# Patient Record
Sex: MALE | Employment: OTHER | URBAN - METROPOLITAN AREA
[De-identification: names, ages, dates, MRNs, and addresses within clinical notes are randomized per-mention and may not be internally consistent; named-entity substitution may affect disease eponyms.]

---

## 2019-09-24 ENCOUNTER — TELEPHONE (OUTPATIENT)
Dept: DERMATOLOGY | Facility: CLINIC | Age: 84
End: 2019-09-24

## 2019-09-24 NOTE — TELEPHONE ENCOUNTER
----- Message from Brittany Nicole sent at 9/24/2019 12:46 PM CDT -----  Contact: Pt  Name of Who is Calling:    What is the request in detail: Patient wife would like a call back regarding changing appointment time Please contact to further discuss and advise     Can the clinic reply by [facility]: no    What Number to Call Back if not in [facility]: [phone] or [phone]

## 2019-10-03 ENCOUNTER — INITIAL CONSULT (OUTPATIENT)
Dept: DERMATOLOGY | Facility: CLINIC | Age: 84
End: 2019-10-03
Payer: MEDICARE

## 2019-10-03 VITALS
HEART RATE: 66 BPM | WEIGHT: 129 LBS | HEIGHT: 66 IN | BODY MASS INDEX: 20.73 KG/M2 | DIASTOLIC BLOOD PRESSURE: 47 MMHG | SYSTOLIC BLOOD PRESSURE: 72 MMHG

## 2019-10-03 DIAGNOSIS — C44.42 SQUAMOUS CELL CARCINOMA OF SCALP: Primary | ICD-10-CM

## 2019-10-03 PROCEDURE — 99999 PR PBB SHADOW E&M-EST. PATIENT-LVL III: ICD-10-PCS | Mod: PBBFAC,,, | Performed by: DERMATOLOGY

## 2019-10-03 PROCEDURE — 99213 OFFICE O/P EST LOW 20 MIN: CPT | Mod: PBBFAC,PO | Performed by: DERMATOLOGY

## 2019-10-03 PROCEDURE — 99202 PR OFFICE/OUTPT VISIT, NEW, LEVL II, 15-29 MIN: ICD-10-PCS | Mod: S$PBB,,, | Performed by: DERMATOLOGY

## 2019-10-03 PROCEDURE — 99999 PR PBB SHADOW E&M-EST. PATIENT-LVL III: CPT | Mod: PBBFAC,,, | Performed by: DERMATOLOGY

## 2019-10-03 PROCEDURE — 99202 OFFICE O/P NEW SF 15 MIN: CPT | Mod: S$PBB,,, | Performed by: DERMATOLOGY

## 2019-10-03 RX ORDER — FUROSEMIDE 20 MG/1
20 TABLET ORAL 2 TIMES DAILY
COMMUNITY

## 2019-10-03 RX ORDER — PRAVASTATIN SODIUM 20 MG/1
20 TABLET ORAL DAILY
COMMUNITY

## 2019-10-03 RX ORDER — PIRFENIDONE 267 MG/1
CAPSULE ORAL
COMMUNITY

## 2019-10-03 RX ORDER — CHOLECALCIFEROL (VITAMIN D3) 25 MCG
1000 TABLET ORAL DAILY
COMMUNITY

## 2019-10-03 RX ORDER — WARFARIN SODIUM 5 MG/1
5 TABLET ORAL DAILY
COMMUNITY

## 2019-10-03 NOTE — PROGRESS NOTES
+++ HAS A DEFIBRILLATOR +++   COUMADIN  ALLERGIES:  Patient has no known allergies.    The patient is accompanied to this visit by his wife.    CHIEF COMPLAINT:  This 86 y.o. 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 Consultation requested by Don Arzate M.D..    HISTORY OF PRESENT ILLNESS:   Location: right scalp  Duration: 6 months or more  Quality: persistent  Context: status post biopsy by Don Arzate M.D. ; path = squamous cell carcinoma; pathology accession # [id number], Skin Pathology   Prior Treatment: none    See also the handwritten notes/diagrams scanned to chart for additional details.    Defibrillator: Yes  Pacemaker: No  Artificial heart valves: No  Artificial joints: No    REVIEW OF SYSTEMS:   General: general health fair  Skin: previous skin cancer(s) Yes     Relevant other:  No   Cardiovascular:   High Blood Pressure: Yes   Chest Pain:  No   Defibrillator: +++ HAS A DEFIBRILLATOR +++ it has never fired  Pacemaker: as above  Artificial heart valves: as above  Prior Endocarditis: No   Prior Heart Attack/MI: [id number]    If yes, when: 1989  Prior Cardiac Bypass or Stents:  No   If yes, when:   Mitral Valve Prolapse: No   Relevant other:  No   Respiratory:   Shortness of breath:  Yes  Relevant other:  Yes. Pulmonary fibrosis; on O2  Endocrine:   Diabetes:  No   Relevant other:  No   Hem/Lymph:   Taking Prescribed Blood Thinners:  COUMADIN  Easy Bleeding:  Yes  Relevant other:  No   Allergy/Immuno: as noted above  Relevant other:  No   GI:   Prior Hepatitis:  No     If yes, details:   Relevant other:  No   Musculoskeletal:   Artificial joints: as above  Relevant other:  No   Neurologic:   Prior Stroke:  No     If yes, details:   Relevant other:  No   Relevant other info:  No      PAST MEDICAL HISTORY:  Past Medical History:   Diagnosis Date    Arthritis     Defibrillator discharge      Hypertension     Pulmonary fibrosis        PAST SURGICAL HISTORY:  Past Surgical History:   Procedure Laterality Date    HERNIA REPAIR          SOCIAL HISTORY:  Dependencies: smoking status as noted below  Social History     Tobacco Use    Smoking status: Not on file   Substance Use Topics    Alcohol use: Not on file    Drug use: Not on file       PERTINENT MEDICATIONS:  See medications list.    Current Outpatient Medications:     furosemide (LASIX) 20 MG tablet, Take 20 mg by mouth 2 (two) times daily., Disp: , Rfl:     pirfenidone (ESBRIET) 267 mg Cap, Take by mouth., Disp: , Rfl:     pravastatin (PRAVACHOL) 20 MG tablet, Take 20 mg by mouth once daily., Disp: , Rfl:     sacubitril-valsartan (ENTRESTO) 24-26 mg per tablet, Take 1 tablet by mouth 2 (two) times daily., Disp: , Rfl:     vitamin D (VITAMIN D3) 1000 units Tab, Take 1,000 Units by mouth once daily., Disp: , Rfl:     warfarin (COUMADIN) 5 MG tablet, Take 5 mg by mouth once daily., Disp: , Rfl:     ALLERGIES:  Patient has no known allergies.    EXAM:  See also the handwritten notes/diagrams scanned to chart for additional details.  Constitutional  General appearance: well-developed, well-nourished, well-kempt older white male; in a wheelchair, on O2    Eyes  Inspection of conjunctivae and lids reveals no abnormalities; sclerae anicteric  Neurologic/Psychiatric  Alert,  normal orientation to time, place, person  Normal mood and affect with no evidence of depression, anxiety, agitation  Skin: see photo(s)  Head: background marked solar damage to exposed areas of skin; on his right scalp there is an approximately 1.5 cm pink, scaly plaque which feels freely movable over the underlying tissues on palpation;  he confirmed this as the site of the prior biopsy  Neck: examination reveals marked chronic solar damage  Right upper extremity: examination reveals marked chronic solar damage; some ecchymosis/ecchymoses   Left upper extremity: examination  reveals marked chronic solar damage; some ecchymosis/ecchymoses   Photo(s) this visit:        ASSESSMENT: 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 In particular, the discussion included, but was not limited to, the following:    One alternative at this point would be to defer further treatment and observe the lesion. With small skin cancers of this kind, it is possible that a biopsy can be sufficient to definitively treat a small skin cancer of this kind. Alternatively, some skin cancers are slow growing and do not require immediate treatment. The potential advantage of this choice would be to avoid the need for possibly unnecessary additional surgery. Among the potential disadvantages of this would be the possibility of enlargement of the lesion, more extensive spread of the lesion or recurrence at a later date, which might necessitate a larger and more complex surgery.    Radiation treatment can be an effective treatment for this type of skin cancer. The usual course of treatment is every weekday for several weeks. Local irritation will result from treatment, although no systemic side effects are expected. The potential advantage of radiation treatment is that it avoids the need for surgery. Among the disadvantages of radiation treatment are the length of treatment, the local inflammatory response, the absence of pathologic confirmation of the removal of the skin cancer, a possible increased risk of additional skin cancer in the treated area in later  years, and a somewhat increased risk of recurrence at a later date.     Excisional surgery can be an effective treatment for this type of skin cancer. This would involve excision of the lesion with margin evaluation by submitting the specimen to a pathologist for either immediate marginal assessment via frozen section processing, or delayed marginal assessment by fixed-tissue processing. The potential advantage of this technique is that it offers a way of treating the lesion with some degree of histologic confirmation of tumor removal. Among the disadvantages of this treatment are the possible need for re-excision if marginal involvement is identified, a somewhat greater likelihood of recurrence as compared to Mohs' surgery because of the less comprehensive margin evaluation inherent in the technique, and the general potential risks of surgery, including allergic reactions to the anesthetic and other materials used, infection, injury to nerves in the area with consequent loss of sensation or muscle function, and scarring or distortion of surrounding structures.    Mohs' surgery is a very effective treatment for this type of skin cancer. The potential advantage of Mohs' surgery is that this technique offers the greatest possible certainty of knowing that the skin cancer has been completely removed, with the removal of the least amount of normal tissue. The potential disadvantages of Mohs' surgery include the duration of the surgery, the possible need for a separate surgery for reconstruction following tumor removal, and scarring as a result. In addition, general potential risks of surgery as noted above also apply to treatment via Mohs' surgery.    In light of the nature of this tumor and the location on the scalp in an area of increased risk of recurrence,  Mohs' micrographic surgery was thought to be the most appropriate management choice, and this diagnosis is appropriate for treatment by Mohs' micrographic  surgery.     Sufficient time was available for questions, and all questions were answered to his satisfaction. He fully understands the aims, risks, alternatives, and possible complications, and has elected to proceed with the surgery, and verbally consented to do so. The procedure will be scheduled in the near future.    Routine pre-op instructions were given to him.    The patient was instructed to continue warfarin prior to surgery.    A consultation report will be sent to Dr. Arzate.  --------------------------------------  Note: Some or all of this note may have been generated using voice recognition software. There may be voice recognition errors including grammatical and/or spelling errors found in the text. Attempts were made to correct these errors prior to signature.

## 2019-10-03 NOTE — LETTER
October 5, 2019      [place]  [address] [place] [address]           [facility] [address] [place] [address]  Phone: [phone]          Patient: Owen White   MR Number: [id number]   YOB: 1932   Date of Visit: 10/3/2019       Dear Dr. Don Arzate III:    Thank you for referring Owen White to me for evaluation. Attached you will find relevant portions of my assessment and plan of care.    If you have questions, please do not hesitate to call me. I look forward to following Owen White along with you.    Sincerely,    Jesus Gómez MD    Enclosure  CC:  No Recipients    If you would like to receive this communication electronically, please contact [email or web address] or [phone] to request more information on [facility] access.    For providers and/or their staff who would like to refer a patient to [facility], please contact us through our one-stop-shop provider referral line, [facility], at [phone].    If you feel you have received this communication in error or would no longer like to receive these types of communications, please e-mail [email or web address]

## 2019-10-19 NOTE — PROGRESS NOTES
+++ HAS A DEFIBRILLATOR +++  Prior photo(s) of site(s) to confirm location(s):    ALLERGIES:   Patient has no known allergies.  +++ HAS A DEFIBRILLATOR +++     Current Outpatient Medications:     furosemide (LASIX) 20 MG tablet, Take 20 mg by mouth 2 (two) times daily., Disp: , Rfl:     pirfenidone (ESBRIET) 267 mg Cap, Take by mouth., Disp: , Rfl:     pravastatin (PRAVACHOL) 20 MG tablet, Take 20 mg by mouth once daily., Disp: , Rfl:     sacubitril-valsartan (ENTRESTO) 24-26 mg per tablet, Take 1 tablet by mouth 2 (two) times daily., Disp: , Rfl:     vitamin D (VITAMIN D3) 1000 units Tab, Take 1,000 Units by mouth once daily., Disp: , Rfl:     warfarin (COUMADIN) 5 MG tablet, Take 5 mg by mouth once daily., Disp: , Rfl:   -------------------------------------------------------------  Allergies: Review of patient's allergies indicates:  No Known Allergies    Chief Complaint: here for Mohs' surgery to superficially invasive squamous cell carcinoma on the right scalp    The patient is accompanied to this visit by his wife.    HPI:   Location: right scalp  Quality: persistent  Timing: I last saw him two and a half weeks ago  Context: prior biopsy showed superficially invasive squamous cell carcinoma     Review of Systems:  CV: has a defibrillator  Resp: on O2 via nasal cannula 24/7 for pulmonary fibrosis  Skin: has some other rough spots on his scalp    Medications: see list    Current Outpatient Medications:     furosemide (LASIX) 20 MG tablet, Take 20 mg by mouth 2 (two) times daily., Disp: , Rfl:     pirfenidone (ESBRIET) 267 mg Cap, Take by mouth., Disp: , Rfl:     pravastatin (PRAVACHOL) 20 MG tablet, Take 20 mg by mouth once daily., Disp: , Rfl:     sacubitril-valsartan (ENTRESTO) 24-26 mg per tablet, Take 1 tablet by mouth 2 (two) times daily., Disp: , Rfl:     vitamin D (VITAMIN D3) 1000 units Tab, Take 1,000 Units by mouth once daily., Disp: , Rfl:     warfarin (COUMADIN) 5 MG tablet, Take 5 mg by  "mouth once daily., Disp: , Rfl:     Review of patient's allergies indicates:  No Known Allergies    Past Medical History:   Diagnosis Date    Arthritis     Defibrillator discharge     Hypertension     Pulmonary fibrosis        Past Surgical History:   Procedure Laterality Date    HERNIA REPAIR         Social History     Socioeconomic History    Marital status:      Spouse name: Not on file    Number of children: Not on file    Years of education: Not on file    Highest education level: Not on file   Occupational History    Not on file   Social Needs    Financial resource strain: Not on file    Food insecurity:     Worry: Not on file     Inability: Not on file    Transportation needs:     Medical: Not on file     Non-medical: Not on file   Tobacco Use    Smoking status: Never Smoker   Substance and Sexual Activity    Alcohol use: Not on file    Drug use: Not on file    Sexual activity: Not on file   Lifestyle    Physical activity:     Days per week: Not on file     Minutes per session: Not on file    Stress: Not on file   Relationships    Social connections:     Talks on phone: Not on file     Gets together: Not on file     Attends Shinto service: Not on file     Active member of club or organization: Not on file     Attends meetings of clubs or organizations: Not on file     Relationship status: Not on file   Other Topics Concern    Not on file   Social History Narrative    Not on file       Vitals:    10/21/19 0736   BP: (!) 76/42   Pulse: 68   Weight: 59 kg (130 lb)   Height: 5' 6" (1.676 m)        EXAM:  Constitutional  - General appearance: well-developed, well-nourished, well-kempt, somewhat frail-appearing white male; on O2 via nasal cannula    Eyes  - Conjunctivae and lids - no abnormalities; sclerae anicteric  Neurologic/Psychiatric  - Orientation - Alert,  normal orientation to time, place, person  - Mood and affect - Normal mood and affect with no evidence of depression, " anxiety, agitation; speech is normal  Skin: see photo below  - Scalp: on his right temporal scalp there is an approximately 1.5 x 2.5 cm pink, centrally crusted plaque at the site of the prior biopsy  Superior/medial to this there is an approx 1 cm pink plaque with some overlying irregular hyperkeratosis which is clinically suggestive of another area of superficially invasive squamous cell carcinoma    Photo today:       Assessment:  Biopsy proven squamous cell carcinoma of the right temporal scalp, pending treatment  Possible second squamous cell carcinoma vs actinic keratosis vs other, superior/medial to the lesion above    Plan:  I discussed with the patient the diagnoses and management options, and risks, benefits and alternatives. This discussion included but was not limited to the following:    I reviewed with him the nature of the previously biopsied site on the scalp. I explained that this is not an issue which requires immediate/urgent treatment.     We discussed my clinical impression regarding the site superior/medial to the previously biopsied site, and options at this point, and the risks and benefits of the alternatives.  In particular, we discussed proceeding with Mohs' surgery to the previously-biopsied site and biopsy of the other lesion at this visit, versus deferring treatment of the previously-biopsied site and biopsy of the other lesion today, to establish a definitive diagnosis and guide further treatment, with the anticipation that if the 2nd lesion is also a skin cancer, both could be treated via Mohs' surgery at the same time.    All questions were answered to his satisfaction.    After discussing the alternatives, he and I have decided to defer surgery to the previously-biopsied site today, and biopsy the other site.    See the procedure note below.    PROCEDURE NOTE: SHAVE BIOPSY    The diagnosis and management options and risk, benefits and alternatives were discussed with the patient.  All questions were answered. Verbal consent was obtained.    Site: right medial scalp  Indication: clinically suspicious lesion; rule out malignancy  Prep: Alcohol  Anesthesia: 1% lidocaine plain with epi 1:100K, less than 2 mL  Shave biopsy performed  Hemostasis with hot-tipped cautery pen  Dressed with petrolatum and bandaid  Specimen placed in formalin to be submitted to pathology    Routine care instructions given  Followup: per path  -----------------------------------------------------------  Note: Some or all of this note may have been generated using voice recognition software. There may be voice recognition errors including grammatical and/or spelling errors found in the text. Attempts were made to correct these errors prior to signature.

## 2019-10-21 ENCOUNTER — PROCEDURE VISIT (OUTPATIENT)
Dept: DERMATOLOGY | Facility: CLINIC | Age: 84
End: 2019-10-21
Payer: MEDICARE

## 2019-10-21 VITALS
HEART RATE: 68 BPM | HEIGHT: 66 IN | BODY MASS INDEX: 20.89 KG/M2 | WEIGHT: 130 LBS | SYSTOLIC BLOOD PRESSURE: 76 MMHG | DIASTOLIC BLOOD PRESSURE: 42 MMHG

## 2019-10-21 DIAGNOSIS — D48.5 NEOPLASM OF UNCERTAIN BEHAVIOR OF SKIN: Primary | ICD-10-CM

## 2019-10-21 PROCEDURE — 11102 TANGNTL BX SKIN SINGLE LES: CPT | Mod: S$PBB,,, | Performed by: DERMATOLOGY

## 2019-10-21 PROCEDURE — 99499 UNLISTED E&M SERVICE: CPT | Mod: S$PBB,,, | Performed by: DERMATOLOGY

## 2019-10-21 PROCEDURE — 11102 TANGNTL BX SKIN SINGLE LES: CPT | Mod: PBBFAC,PO | Performed by: DERMATOLOGY

## 2019-10-21 PROCEDURE — 88305 TISSUE EXAM BY PATHOLOGIST: CPT | Performed by: PATHOLOGY

## 2019-10-21 PROCEDURE — 88305 TISSUE EXAM BY PATHOLOGIST: CPT | Mod: 26,,, | Performed by: PATHOLOGY

## 2019-10-21 PROCEDURE — 99499 NO LOS: ICD-10-PCS | Mod: S$PBB,,, | Performed by: DERMATOLOGY

## 2019-10-21 PROCEDURE — 11102 PR TANGENTIAL BIOPSY, SKIN, SINGLE LESION: ICD-10-PCS | Mod: S$PBB,,, | Performed by: DERMATOLOGY

## 2019-10-21 PROCEDURE — 88305 TISSUE SPECIMEN TO PATHOLOGY, DERMATOLOGY: ICD-10-PCS | Mod: 26,,, | Performed by: PATHOLOGY

## 2019-10-30 ENCOUNTER — TELEPHONE (OUTPATIENT)
Dept: DERMATOLOGY | Facility: CLINIC | Age: 84
End: 2019-10-30

## 2019-10-30 NOTE — TELEPHONE ENCOUNTER
----- Message from Jesus Gómez MD sent at 10/27/2019 10:15 AM CDT -----  Margot,   This was another skin cancer as suspected.   Ask me please; we will need to get him in to see Dr. Gates.  FINAL PATHOLOGIC DIAGNOSIS  Skin, right medial scalp, shave biopsy:  - BASAL CELL CARCINOMA, NODULAR GROWTH PATTERN WITH FOCAL SQUAMOUS DIFFERENTIATION.  - THE DEEP AND LATERAL BIOPSY EDGES ARE INVOVLED.  [facility]  Diagnosed by: Jessica Bell  (Electronically Signed: 2019-10-26 18:29:32)

## 2019-11-20 ENCOUNTER — OFFICE VISIT (OUTPATIENT)
Dept: OTOLARYNGOLOGY | Facility: CLINIC | Age: 84
End: 2019-11-20
Payer: MEDICARE

## 2019-11-20 ENCOUNTER — OFFICE VISIT (OUTPATIENT)
Dept: DERMATOLOGY | Facility: CLINIC | Age: 84
End: 2019-11-20
Payer: MEDICARE

## 2019-11-20 VITALS — HEIGHT: 66 IN | WEIGHT: 130.06 LBS | BODY MASS INDEX: 20.9 KG/M2

## 2019-11-20 DIAGNOSIS — C44.41 BASAL CELL CARCINOMA (BCC) OF SCALP: ICD-10-CM

## 2019-11-20 DIAGNOSIS — C44.42 SQUAMOUS CELL CARCINOMA OF SCALP: Primary | ICD-10-CM

## 2019-11-20 DIAGNOSIS — Z79.01 LONG TERM (CURRENT) USE OF ANTICOAGULANTS: ICD-10-CM

## 2019-11-20 DIAGNOSIS — C44.41 BASAL CELL CARCINOMA OF SCALP: ICD-10-CM

## 2019-11-20 DIAGNOSIS — J84.10 PULMONARY FIBROSIS: ICD-10-CM

## 2019-11-20 PROCEDURE — 99999 PR PBB SHADOW E&M-EST. PATIENT-LVL III: CPT | Mod: PBBFAC,,, | Performed by: OTOLARYNGOLOGY

## 2019-11-20 PROCEDURE — 99212 PR OFFICE/OUTPT VISIT, EST, LEVL II, 10-19 MIN: ICD-10-PCS | Mod: S$PBB,,, | Performed by: DERMATOLOGY

## 2019-11-20 PROCEDURE — 99204 PR OFFICE/OUTPT VISIT, NEW, LEVL IV, 45-59 MIN: ICD-10-PCS | Mod: S$PBB,,, | Performed by: OTOLARYNGOLOGY

## 2019-11-20 PROCEDURE — 99212 OFFICE O/P EST SF 10 MIN: CPT | Mod: PBBFAC,PO | Performed by: DERMATOLOGY

## 2019-11-20 PROCEDURE — 99999 PR PBB SHADOW E&M-EST. PATIENT-LVL II: CPT | Mod: PBBFAC,,, | Performed by: DERMATOLOGY

## 2019-11-20 PROCEDURE — 99212 OFFICE O/P EST SF 10 MIN: CPT | Mod: S$PBB,,, | Performed by: DERMATOLOGY

## 2019-11-20 PROCEDURE — 99204 OFFICE O/P NEW MOD 45 MIN: CPT | Mod: S$PBB,,, | Performed by: OTOLARYNGOLOGY

## 2019-11-20 PROCEDURE — 99999 PR PBB SHADOW E&M-EST. PATIENT-LVL III: ICD-10-PCS | Mod: PBBFAC,,, | Performed by: OTOLARYNGOLOGY

## 2019-11-20 PROCEDURE — 99999 PR PBB SHADOW E&M-EST. PATIENT-LVL II: ICD-10-PCS | Mod: PBBFAC,,, | Performed by: DERMATOLOGY

## 2019-11-20 PROCEDURE — 99213 OFFICE O/P EST LOW 20 MIN: CPT | Mod: PBBFAC,27,PO | Performed by: OTOLARYNGOLOGY

## 2019-11-20 RX ORDER — WARFARIN SODIUM 5 MG/1
5 TABLET ORAL
COMMUNITY
Start: 2018-08-10

## 2019-11-20 NOTE — Clinical Note
Please fax this note with a request to be off Coumadin for 5 days:Cardiologist number is [phone].Thanks

## 2019-11-20 NOTE — PROGRESS NOTES
Subjective:       Patient ID: Owen White is a 87 y.o. male.    Chief Complaint: SCC & BCC    Owen is here for evaluation of SCC and BCC of his scalp. Both lesions are near each other.The lesion has been present for months. no pain, no bleeding, norapid growth in a short time. There is no history of previous skin cancer in the scalp. yes cardiac issues - CHF and defibrillator, yes anti-coagulants - Coumadin, no anesthesia issues. He does have pulmonary fibrosis and is oxygen dependent.     Social History     Tobacco Use   Smoking Status Never Smoker     Social History     Substance and Sexual Activity   Alcohol Use Not on file          Review of Systems   Constitutional: Negative for activity change and appetite change.   Eyes: Negative for discharge.   Respiratory: Negative for difficulty breathing and wheezing   Cardiovascular: Negative for chest pain.   Gastrointestinal: Negative for abdominal distention and abdominal pain.   Endocrine: Negative for cold intolerance and heat intolerance.   Genitourinary: Negative for dysuria.   Musculoskeletal: Negative for gait problem and joint swelling.   Skin: Negative for color change and pallor.   Neurological: Negative for syncope and weakness.   Psychiatric/Behavioral: Negative for agitation and confusion.     Objective:        Constitutional:   He is oriented to person, place, and time. He appears well-developed and well-nourished. He appears alert. He is active.   Nasal cannula Normal speech.      Head:  Normocephalic and atraumatic. Head is without TMJ tenderness. No scars. Salivary glands normal.  Facial strength is normal.        2 lesions near 1 cm, about 3 cm from each other, consistent with skin cancers previously biopsied     Ears:    Right Ear: No drainage or swelling. No middle ear effusion.   Left Ear: No drainage or swelling.  No middle ear effusion.     Nose:  No mucosal edema, rhinorrhea or sinus tenderness. No turbinate hypertrophy.       Mouth/Throat  Oropharynx clear and moist without lesions or asymmetry, normal uvula midline and mirror exam normal. Normal dentition. No uvula swelling, lacerations or trismus. No oropharyngeal exudate. Tonsillar erythema, tonsillar exudate.      Neck:  Full range of motion with neck supple and no adenopathy. Thyroid tenderness is present. No tracheal deviation, no edema, no erythema, normal range of motion, no stridor, no crepitus and no neck rigidity present. No thyroid mass present.     Cardiovascular:   Intact distal pulses and normal pulses.      Pulmonary/Chest:   Effort normal and breath sounds normal. No stridor.     Psychiatric:   His speech is normal and behavior is normal. His mood appears not anxious. His affect is not labile.     Neurological:   He is alert and oriented to person, place, and time. No sensory deficit.     Skin:   No abrasions, lacerations, lesions, or rashes. No abrasion and no bruising noted.         Tests / Results:  none    Assessment:       1. Squamous cell carcinoma of scalp    2. Basal cell carcinoma (BCC) of scalp    3. Pulmonary fibrosis    4. Long term (current) use of anticoagulants          Plan:         Owen White is scheduled to undergo Moh's surgery for excision of the lesion. Dr. Gómez will perform the resection, and I will be available for reconstruction of the defect.  He is at high anesthesia risk with his pulmonary fibrosis and anticoagulated status.  We did discuss closure which would involve likely to small rotational flaps, 1 larger flap if both of them needed to be connected.  I also discussed healing by secondary intention which would result in a cosmetic change and alopecia over the scarred area with a depression.  However, given his anesthesia ris and possible healing issues, this would be a reasonable option as well. They would like to avoid an open wound and would like to proceed with closure.  I will request if he can be off his Coumadin 3-4 days  prior and can resume 24 hr after.  We will fax a note to his cardiologist for this.  We will plan to reconstruct under local anesthesia at [facility] with possibly a little sedation.

## 2019-11-20 NOTE — LETTER
November 21, 2019      Jesus Gómez MD  [address]  [facility] [address]           [facility]  [address] [place] [address]  Phone: [phone]  Fax: [phone]          Patient: Owen White   MR Number: [id number]   YOB: 1932   Date of Visit: 11/20/2019       Dear Dr. Jesus Gómez:    Thank you for referring Owen White to me for evaluation. Attached you will find relevant portions of my assessment and plan of care.    If you have questions, please do not hesitate to call me. I look forward to following Owen White along with you.    Sincerely,    Gonzales Gates MD    Enclosure  CC:  No Recipients    If you would like to receive this communication electronically, please contact [email or web address] or [phone] to request more information on [facility] Link access.    For providers and/or their staff who would like to refer a patient to [facility], please contact us through our one-stop-shop provider referral line, [facility], at [phone].    If you feel you have received this communication in error or would no longer like to receive these types of communications, please e-mail [email or web address]

## 2019-11-20 NOTE — Clinical Note
I discussed options with him, including healing by secondary intention which would not be the worst idea given his history of pulmonary fibrosis.  I will plan to do it under local with some sedation.  Ideally you will be able to keep the 2 separate and I can do some small rotational flaps.

## 2019-11-20 NOTE — PROGRESS NOTES
+++ HAS A DEFIBRILLATOR +++   ALLERGIES:  Patient has no known allergies.    CHIEF COMPLAINT: followup post biopsy    HISTORY OF PRESENT ILLNESS:  Location: right scalp  Timing: I last saw him 4 week(s) ago   Context: pathology showed basal cell carcinoma; see pathology report in chart  See previous notes; also status post biopsy on of a superficially invasive squamous cell carcinoma near this site  Quality: not much change    PATH:   FINAL PATHOLOGIC DIAGNOSIS  Skin, right medial scalp, shave biopsy:  - BASAL CELL CARCINOMA, NODULAR GROWTH PATTERN WITH FOCAL SQUAMOUS DIFFERENTIATION.  - THE DEEP AND LATERAL BIOPSY EDGES ARE INVOVLED.  [facility]  Diagnosed by: Jessica Bell  (Electronically Signed: 2019-10-26 18:29:32)    REVIEW OF SYSTEMS:   CV: +++ HAS A DEFIBRILLATOR +++   Resp: has pulmonary fibrosis; on Oxygen per nasal cannula  Allergy/Immuno: has no allergies as noted above    PAST MEDICAL HISTORY:  Past Medical History:  No date: Arthritis  No date: Defibrillator discharge  No date: Hypertension  No date: Pulmonary fibrosis    PAST SURGICAL HISTORY:  Past Surgical History:  No date: HERNIA REPAIR    SOCIAL HISTORY:  Social History    Tobacco Use      Smoking status: Never Smoker    Alcohol use: Not on file    Drug use: Not on file       PERTINENT MEDICATIONS:  See medications list.  Current Outpatient Medications on File Prior to Visit:  furosemide (LASIX) 20 MG tablet, Take 20 mg by mouth 2 (two) times daily., Disp: , Rfl:   pirfenidone (ESBRIET) 267 mg Cap, Take by mouth., Disp: , Rfl:   pravastatin (PRAVACHOL) 20 MG tablet, Take 20 mg by mouth once daily., Disp: , Rfl:   sacubitril-valsartan (ENTRESTO) 24-26 mg per tablet, Take 1 tablet by mouth 2 (two) times daily., Disp: , Rfl:   vitamin D (VITAMIN D3) 1000 units Tab, Take 1,000 Units by mouth once daily., Disp: , Rfl:   warfarin (COUMADIN) 5 MG tablet, Take 5 mg by mouth once daily., Disp: , Rfl:     No current facility-administered medications on file prior  to visit.     EXAM:  Constitutional  General appearance: well-developed, well-nourished, well-kempt elderly white male; in a wheelchair; on O2 via nasal cannula    Eyes  Inspection of conjunctivae and lids reveals no abnormalities; sclerae anicteric  Neurologic/Psychiatric  Alert,  normal orientation to time, place, person  Normal mood and affect with no evidence of depression, anxiety, agitation  Skin: see photo(s)  Head: background marked solar damage to exposed areas of skin  On the right scalp vertex there two 1+cm erythematous spots as noted in the photo below     Photo(s) from prior visit:      ASSESSMENT:   biopsy-proven superficially invasive squamous cell carcinoma and biopsy-proven basal cell carcinoma, right scalp, pending treatment    PLAN:  The diagnoses and management options, and risks and benefits of the alternatives, including observation/non-treatment, radiation treatment, excision with vertical frozen section or paraffin-embedded section margin evaluation, and Mohs' Micrographic Surgery, Fresh Tissue Technique, were discussed at length with the patient. In particular, the discussion included, but was not limited to, the following:    One alternative at this point would be to defer further treatment and observe the lesion(s). With small skin cancers of this kind, it is possible that a biopsy can be sufficient to definitively treat a small skin cancer of this kind. Alternatively, some skin cancers are slow growing and do not require immediate treatment. The potential advantage of this choice would be to avoid the need for possibly unnecessary additional surgery. Among the potential disadvantages of this would be the possibility of enlargement of the lesion, more extensive spread of the lesion or recurrence at a later date, which might necessitate a larger and more complex surgery.    Radiation treatment can be an effective treatment for this type of skin cancer. The usual course of treatment is  every weekday for several weeks. Local irritation will result from treatment, although no systemic side effects are expected. The potential advantage of radiation treatment is that it avoids the need for surgery. Among the disadvantages of radiation treatment are the length of treatment, the local inflammatory response, the absence of pathologic confirmation of the removal of the skin cancer, a possible increased risk of additional skin cancer in the treated area in later years, and a somewhat increased risk of recurrence at a later date.     Excisional surgery can be an effective treatment for this type of skin cancer. This would involve excision of the lesion with margin evaluation by submitting the specimen to a pathologist for either immediate marginal assessment via frozen section processing, or delayed marginal assessment by fixed-tissue processing. The potential advantage of this technique is that it offers a way of treating the lesion with some degree of histologic confirmation of tumor removal. Among the disadvantages of this treatment are the possible need for re-excision if marginal involvement is identified, a somewhat greater likelihood of recurrence as compared to Mohs' surgery because of the less comprehensive margin evaluation inherent in the technique, and the general potential risks of surgery, including allergic reactions to the anesthetic and other materials used, infection, injury to nerves in the area with consequent loss of sensation or muscle function, and scarring or distortion of surrounding structures.    Mohs' surgery is a very effective treatment for this type of skin cancer. The potential advantage of Mohs' surgery is that this technique offers the greatest possible certainty of knowing that the skin cancer has been completely removed, with the removal of the least amount of normal tissue. The potential disadvantages of Mohs' surgery include the duration of the surgery, the possible  need for a separate surgery for reconstruction following tumor removal, and scarring as a result. In addition, general potential risks of surgery as noted above also apply to treatment via Mohs' surgery.    In light of the nature of these tumors and the location(s) on the scalp in an area of increased risk of recurrence,  Mohs' micrographic surgery was thought to be the most appropriate management choice, and this diagnosis is appropriate for treatment by Mohs' micrographic surgery.     We also discussed options for repair of the site to follow tumor removal via Mohs' surgery, including the participation of a reconstructive surgeon to reconstruct the resultant wound. Given the location, the anticipated size and potential complexity of the defect to follow tumor removal via Mohs' surgery, reconstruction will be coordinated with Dr. Gonzales Gates. He is scheduled to see Dr. Gates this afternoon in consultation, following which we will coordinate his surgeries.    Sufficient time was available for questions, and all questions were answered to his satisfaction. He fully understands the aims, risks, alternatives, and possible complications, and has elected to proceed with the surgery, and verbally consented to do so. The procedure will be scheduled in the near future.    --------------------------------------  Note: Some or all of this note may have been generated using voice recognition software. There may be voice recognition errors including grammatical and/or spelling errors found in the text. Attempts were made to correct these errors prior to signature.    =================  note of 10/21  +++ HAS A DEFIBRILLATOR +++  Prior photo(s) of site(s) to confirm location(s):    ALLERGIES:   Patient has no known allergies.  +++ HAS A DEFIBRILLATOR +++     Current Outpatient Medications:     furosemide (LASIX) 20 MG tablet, Take 20 mg by mouth 2 (two) times daily., Disp: , Rfl:     pirfenidone (ESBRIET) 267 mg Cap, Take  by mouth., Disp: , Rfl:     pravastatin (PRAVACHOL) 20 MG tablet, Take 20 mg by mouth once daily., Disp: , Rfl:     sacubitril-valsartan (ENTRESTO) 24-26 mg per tablet, Take 1 tablet by mouth 2 (two) times daily., Disp: , Rfl:     vitamin D (VITAMIN D3) 1000 units Tab, Take 1,000 Units by mouth once daily., Disp: , Rfl:     warfarin (COUMADIN) 5 MG tablet, Take 5 mg by mouth once daily., Disp: , Rfl:   -------------------------------------------------------------  Allergies: Review of patient's allergies indicates:  No Known Allergies    Chief Complaint: here for Mohs' surgery to superficially invasive squamous cell carcinoma on the right scalp    The patient is accompanied to this visit by his wife.    HPI:   Location: right scalp  Quality: persistent  Timing: I last saw him two and a half weeks ago  Context: prior biopsy showed superficially invasive squamous cell carcinoma     Review of Systems:  CV: has a defibrillator  Resp: on O2 via nasal cannula 24/7 for pulmonary fibrosis  Skin: has some other rough spots on his scalp    Medications: see list    Current Outpatient Medications:     furosemide (LASIX) 20 MG tablet, Take 20 mg by mouth 2 (two) times daily., Disp: , Rfl:     pirfenidone (ESBRIET) 267 mg Cap, Take by mouth., Disp: , Rfl:     pravastatin (PRAVACHOL) 20 MG tablet, Take 20 mg by mouth once daily., Disp: , Rfl:     sacubitril-valsartan (ENTRESTO) 24-26 mg per tablet, Take 1 tablet by mouth 2 (two) times daily., Disp: , Rfl:     vitamin D (VITAMIN D3) 1000 units Tab, Take 1,000 Units by mouth once daily., Disp: , Rfl:     warfarin (COUMADIN) 5 MG tablet, Take 5 mg by mouth once daily., Disp: , Rfl:     Review of patient's allergies indicates:  No Known Allergies    Past Medical History:   Diagnosis Date    Arthritis     Defibrillator discharge     Hypertension     Pulmonary fibrosis        Past Surgical History:   Procedure Laterality Date    HERNIA REPAIR         Social History  "    Socioeconomic History    Marital status:      Spouse name: Not on file    Number of children: Not on file    Years of education: Not on file    Highest education level: Not on file   Occupational History    Not on file   Social Needs    Financial resource strain: Not on file    Food insecurity:     Worry: Not on file     Inability: Not on file    Transportation needs:     Medical: Not on file     Non-medical: Not on file   Tobacco Use    Smoking status: Never Smoker   Substance and Sexual Activity    Alcohol use: Not on file    Drug use: Not on file    Sexual activity: Not on file   Lifestyle    Physical activity:     Days per week: Not on file     Minutes per session: Not on file    Stress: Not on file   Relationships    Social connections:     Talks on phone: Not on file     Gets together: Not on file     Attends [employer] service: Not on file     Active member of club or organization: Not on file     Attends meetings of clubs or organizations: Not on file     Relationship status: Not on file   Other Topics Concern    Not on file   Social History Narrative    Not on file       Vitals:    10/21/19 0736   BP: (!) 76/42   Pulse: 68   Weight: 59 kg (130 lb)   Height: 5' 6" (1.676 m)        EXAM:  Constitutional  - General appearance: well-developed, well-nourished, well-kempt, somewhat frail-appearing white male; on O2 via nasal cannula    Eyes  - Conjunctivae and lids - no abnormalities; sclerae anicteric  Neurologic/Psychiatric  - Orientation - Alert,  normal orientation to time, place, person  - Mood and affect - Normal mood and affect with no evidence of depression, anxiety, agitation; speech is normal  Skin: see photo below  - Scalp: on his right temporal scalp there is an approximately 1.5 x 2.5 cm pink, centrally crusted plaque at the site of the prior biopsy  Superior/medial to this there is an approx 1 cm pink plaque with some overlying irregular hyperkeratosis which is clinically " suggestive of another area of superficially invasive squamous cell carcinoma    Photo today:       Assessment:  Biopsy proven squamous cell carcinoma of the right temporal scalp, pending treatment  Possible second squamous cell carcinoma vs actinic keratosis vs other, superior/medial to the lesion above    Plan:  I discussed with the patient the diagnoses and management options, and risks, benefits and alternatives. This discussion included but was not limited to the following:    I reviewed with him the nature of the previously biopsied site on the scalp. I explained that this is not an issue which requires immediate/urgent treatment.     We discussed my clinical impression regarding the site superior/medial to the previously biopsied site, and options at this point, and the risks and benefits of the alternatives.  In particular, we discussed proceeding with Mohs' surgery to the previously-biopsied site and biopsy of the other lesion at this visit, versus deferring treatment of the previously-biopsied site and biopsy of the other lesion today, to establish a definitive diagnosis and guide further treatment, with the anticipation that if the 2nd lesion is also a skin cancer, both could be treated via Mohs' surgery at the same time.    All questions were answered to his satisfaction.    After discussing the alternatives, he and I have decided to defer surgery to the previously-biopsied site today, and biopsy the other site.    See the procedure note below.    PROCEDURE NOTE: SHAVE BIOPSY    The diagnosis and management options and risk, benefits and alternatives were discussed with the patient. All questions were answered. Verbal consent was obtained.    Site: right medial scalp  Indication: clinically suspicious lesion; rule out malignancy  Prep: Alcohol  Anesthesia: 1% lidocaine plain with epi 1:100K, less than 2 mL  Shave biopsy performed  Hemostasis with hot-tipped cautery pen  Dressed with petrolatum and  bandaid  Specimen placed in formalin to be submitted to pathology    Routine care instructions given  Followup: per path  -----------------------------------------------------------  Note: Some or all of this note may have been generated using voice recognition software. There may be voice recognition errors including grammatical and/or spelling errors found in the text. Attempts were made to correct these errors prior to signature.

## 2019-11-21 ENCOUNTER — TELEPHONE (OUTPATIENT)
Dept: OTOLARYNGOLOGY | Facility: CLINIC | Age: 84
End: 2019-11-21

## 2019-11-21 NOTE — TELEPHONE ENCOUNTER
----- Message from Gonzales Gates MD sent at 11/21/2019 10:07 AM CST -----  Please fax this note with a request to be off Coumadin for 5 days:  Cardiologist number is [phone].    Thanks

## 2019-12-02 ENCOUNTER — TELEPHONE (OUTPATIENT)
Dept: OTOLARYNGOLOGY | Facility: CLINIC | Age: 84
End: 2019-12-02

## 2020-01-09 ENCOUNTER — OFFICE VISIT (OUTPATIENT)
Dept: DERMATOLOGY | Facility: CLINIC | Age: 85
End: 2020-01-09
Payer: OTHER GOVERNMENT

## 2020-01-09 DIAGNOSIS — C44.41 BASAL CELL CARCINOMA OF SCALP: Primary | ICD-10-CM

## 2020-01-09 DIAGNOSIS — C44.42 SQUAMOUS CELL CARCINOMA OF SCALP: ICD-10-CM

## 2020-01-09 PROCEDURE — 99999 PR PBB SHADOW E&M-EST. PATIENT-LVL II: ICD-10-PCS | Mod: PBBFAC,,, | Performed by: DERMATOLOGY

## 2020-01-09 PROCEDURE — 99212 OFFICE O/P EST SF 10 MIN: CPT | Mod: PBBFAC,PO | Performed by: DERMATOLOGY

## 2020-01-09 PROCEDURE — 99212 OFFICE O/P EST SF 10 MIN: CPT | Mod: S$PBB,,, | Performed by: DERMATOLOGY

## 2020-01-09 PROCEDURE — 99212 PR OFFICE/OUTPT VISIT, EST, LEVL II, 10-19 MIN: ICD-10-PCS | Mod: S$PBB,,, | Performed by: DERMATOLOGY

## 2020-01-09 PROCEDURE — 99999 PR PBB SHADOW E&M-EST. PATIENT-LVL II: CPT | Mod: PBBFAC,,, | Performed by: DERMATOLOGY

## 2020-01-09 NOTE — PROGRESS NOTES
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 Gates  He subsequently decided to hold off on surgery  he returns today for followup/re-evaluation      REVIEW OF SYSTEMS:   Resp: now O2 dependent 24/7 secondary to pulmonary fibrosis  CV: +++ HAS A DEFIBRILLATOR +++  Heme: on COUMADIN    PAST MEDICAL HISTORY:  Past Medical History:   Diagnosis Date    Arthritis     Defibrillator discharge     Hypertension     Pulmonary fibrosis        PAST SURGICAL HISTORY:  Past Surgical History:   Procedure Laterality Date    HERNIA REPAIR         SOCIAL HISTORY:  Social History     Tobacco Use    Smoking status: Never Smoker   Substance Use Topics    Alcohol use: Not on file    Drug use: Not on file        PERTINENT MEDICATIONS:  See medications list.  Current Outpatient Medications on File Prior to Visit   Medication Sig Dispense Refill    furosemide (LASIX) 20 MG tablet Take 20 mg by mouth 2 (two) times daily.      OXYGEN-AIR DELIVERY SYSTEMS MISC by Other route.      pirfenidone (ESBRIET) 267 mg Cap Take by mouth.      pravastatin (PRAVACHOL) 20 MG tablet Take 20 mg by mouth once daily.      sacubitril-valsartan (ENTRESTO) 24-26 mg per tablet Take 1 tablet by mouth 2 (two) times daily.      vitamin D (VITAMIN D3) 1000 units Tab Take 1,000 Units by mouth once daily.      warfarin (COUMADIN) 5 MG tablet Take 5 mg by mouth once daily.      warfarin (COUMADIN) 5 MG tablet Take 5 mg by mouth.       No current  facility-administered medications on file prior to visit.        EXAM:  Constitutional  General appearance: well-developed, well-nourished, well-kempt elderly white male    Eyes  Inspection of conjunctivae and lids reveals no abnormalities; sclerae anicteric  Neurologic/Psychiatric  Alert,  normal orientation to time, place, person  Normal mood and affect with no evidence of depression, anxiety, agitation  Skin: see photo(s)  Head: background marked solar damage to exposed areas of skin  The areas of the previous biopsies on his right scalp show no real evidence of skin cancer at this time at either site    Photo today      Prior photo      ASSESSMENT:   Status post biopsies of superficially invasive squamous cell carcinoma and basal cell carcinoma  on the right scalp by Dr. Arzate, now clinically post biopsies  chronic solar damage to areas as noted above  Pulmonary fibrosis; O2 dependent    PLAN:  The diagnosis/diagnoses and management options, and risks and benefits of the alternatives, including observation/non-treatment, radiation treatment, excision with vertical frozen section or paraffin-embedded section margin evaluation, and Mohs' Micrographic Surgery, Fresh Tissue Technique, were discussed at length with the patient. In particular, the discussion included, but was not limited to, the following:    One alternative at this point would be to defer further treatment and observe the lesion(s). With small skin cancers of this kind, it is possible that a biopsy can be sufficient to definitively treat a small skin cancer of this kind. Alternatively, some skin cancers are slow growing and do not require immediate treatment. The potential advantage of this choice would be to avoid the need for possibly unnecessary additional surgery. Among the potential disadvantages of this would be the possibility of enlargement of the lesion, more extensive spread of the lesion or recurrence at a later date, which might  necessitate a larger and more complex surgery.    Radiation treatment can be an effective treatment for this type of skin cancer. The usual course of treatment is every weekday for several weeks. Local irritation will result from treatment, although no systemic side effects are expected. The potential advantage of radiation treatment is that it avoids the need for surgery. Among the disadvantages of radiation treatment are the length of treatment, the local inflammatory response, the absence of pathologic confirmation of the removal of the skin cancer, a possible increased risk of additional skin cancer in the treated area in later years, and a somewhat increased risk of recurrence at a later date.     Excisional surgery can be an effective treatment for this type of skin cancer. This would involve excision of the lesion with margin evaluation by submitting the specimen to a pathologist for either immediate marginal assessment via frozen section processing, or delayed marginal assessment by fixed-tissue processing. The potential advantage of this technique is that it offers a way of treating the lesion with some degree of histologic confirmation of tumor removal. Among the disadvantages of this treatment are the possible need for re-excision if marginal involvement is identified, a somewhat greater likelihood of recurrence as compared to Mohs' surgery because of the less comprehensive margin evaluation inherent in the technique, and the general potential risks of surgery, including allergic reactions to the anesthetic and other materials used, infection, injury to nerves in the area with consequent loss of sensation or muscle function, and scarring or distortion of surrounding structures.    Mohs' surgery is a very effective treatment for this type of skin cancer. The potential advantage of Mohs' surgery is that this technique offers the greatest possible certainty of knowing that the skin cancer has been completely  removed, with the removal of the least amount of normal tissue. The potential disadvantages of Mohs' surgery include the duration of the surgery, the possible need for a separate surgery for reconstruction following tumor removal, and scarring as a result. In addition, general potential risks of surgery as noted above also apply to treatment via Mohs' surgery.    Sufficient time was available for questions, and all questions were answered to his satisfaction.     After discussing the clinical findings and the treatment alternatives, and the risks and benefits of the alternatives at length and in detail, and given the absence of any evidence of residual tumor to the biopsy site(s) at present, he and I have decided to postpone surgical treatment and to observe the site(s) for the present.    An appointment will be made for him to follow-up for re-evaluation in 3months. He is to call to be seen sooner if any changes or signs of recurrence, which were reviewed, should arise in the meantime.    I will send a letter regarding his status to Dr. Arzate.  --------------------------------------  Note: Some or all of this note may have been generated using voice recognition software. There may be voice recognition errors including grammatical and/or spelling errors found in the text. Attempts were made to correct these errors prior to signature.       ============================================================  PREVIOUS NOTE(S):  Note of 11/20     +++ HAS A DEFIBRILLATOR +++   ALLERGIES:  Patient has no known allergies.    CHIEF COMPLAINT: followup post biopsy    HISTORY OF PRESENT ILLNESS:  Location: right scalp  Timing: I last saw him 4 week(s) ago   Context: pathology showed basal cell carcinoma; see pathology report in chart  See previous notes; also status post biopsy on of a superficially invasive squamous cell carcinoma near this site  Quality: not much change    PATH:   FINAL PATHOLOGIC DIAGNOSIS  Skin, right medial  scalp, shave biopsy:  - BASAL CELL CARCINOMA, NODULAR GROWTH PATTERN WITH FOCAL SQUAMOUS DIFFERENTIATION.  - THE DEEP AND LATERAL BIOPSY EDGES ARE INVOVLED.  [facility]  Diagnosed by: Jessica Bell  (Electronically Signed: 2019-10-26 18:29:32)    REVIEW OF SYSTEMS:   CV: +++ HAS A DEFIBRILLATOR +++   Resp: has pulmonary fibrosis; on Oxygen per nasal cannula  Allergy/Immuno: has no allergies as noted above    PAST MEDICAL HISTORY:  Past Medical History:  No date: Arthritis  No date: Defibrillator discharge  No date: Hypertension  No date: Pulmonary fibrosis    PAST SURGICAL HISTORY:  Past Surgical History:  No date: HERNIA REPAIR    SOCIAL HISTORY:  Social History    Tobacco Use      Smoking status: Never Smoker    Alcohol use: Not on file    Drug use: Not on file       PERTINENT MEDICATIONS:  See medications list.  Current Outpatient Medications on File Prior to Visit:  furosemide (LASIX) 20 MG tablet, Take 20 mg by mouth 2 (two) times daily., Disp: , Rfl:   pirfenidone (ESBRIET) 267 mg Cap, Take by mouth., Disp: , Rfl:   pravastatin (PRAVACHOL) 20 MG tablet, Take 20 mg by mouth once daily., Disp: , Rfl:   sacubitril-valsartan (ENTRESTO) 24-26 mg per tablet, Take 1 tablet by mouth 2 (two) times daily., Disp: , Rfl:   vitamin D (VITAMIN D3) 1000 units Tab, Take 1,000 Units by mouth once daily., Disp: , Rfl:   warfarin (COUMADIN) 5 MG tablet, Take 5 mg by mouth once daily., Disp: , Rfl:     No current facility-administered medications on file prior to visit.     EXAM:  Constitutional  General appearance: well-developed, well-nourished, well-kempt elderly white male; in a wheelchair; on O2 via nasal cannula    Eyes  Inspection of conjunctivae and lids reveals no abnormalities; sclerae anicteric  Neurologic/Psychiatric  Alert,  normal orientation to time, place, person  Normal mood and affect with no evidence of depression, anxiety, agitation  Skin: see photo(s)  Head: background marked solar damage to exposed areas of skin  On  the right scalp vertex there two 1+cm erythematous spots as noted in the photo below     Photo(s) from prior visit:      ASSESSMENT:   biopsy-proven superficially invasive squamous cell carcinoma and biopsy-proven basal cell carcinoma, right scalp, pending treatment    PLAN:  The diagnoses and management options, and risks and benefits of the alternatives, including observation/non-treatment, radiation treatment, excision with vertical frozen section or paraffin-embedded section margin evaluation, and Mohs' Micrographic Surgery, Fresh Tissue Technique, were discussed at length with the patient. In particular, the discussion included, but was not limited to, the following:    One alternative at this point would be to defer further treatment and observe the lesion(s). With small skin cancers of this kind, it is possible that a biopsy can be sufficient to definitively treat a small skin cancer of this kind. Alternatively, some skin cancers are slow growing and do not require immediate treatment. The potential advantage of this choice would be to avoid the need for possibly unnecessary additional surgery. Among the potential disadvantages of this would be the possibility of enlargement of the lesion, more extensive spread of the lesion or recurrence at a later date, which might necessitate a larger and more complex surgery.    Radiation treatment can be an effective treatment for this type of skin cancer. The usual course of treatment is every weekday for several weeks. Local irritation will result from treatment, although no systemic side effects are expected. The potential advantage of radiation treatment is that it avoids the need for surgery. Among the disadvantages of radiation treatment are the length of treatment, the local inflammatory response, the absence of pathologic confirmation of the removal of the skin cancer, a possible increased risk of additional skin cancer in the treated area in later years, and a  somewhat increased risk of recurrence at a later date.     Excisional surgery can be an effective treatment for this type of skin cancer. This would involve excision of the lesion with margin evaluation by submitting the specimen to a pathologist for either immediate marginal assessment via frozen section processing, or delayed marginal assessment by fixed-tissue processing. The potential advantage of this technique is that it offers a way of treating the lesion with some degree of histologic confirmation of tumor removal. Among the disadvantages of this treatment are the possible need for re-excision if marginal involvement is identified, a somewhat greater likelihood of recurrence as compared to Mohs' surgery because of the less comprehensive margin evaluation inherent in the technique, and the general potential risks of surgery, including allergic reactions to the anesthetic and other materials used, infection, injury to nerves in the area with consequent loss of sensation or muscle function, and scarring or distortion of surrounding structures.    Mohs' surgery is a very effective treatment for this type of skin cancer. The potential advantage of Mohs' surgery is that this technique offers the greatest possible certainty of knowing that the skin cancer has been completely removed, with the removal of the least amount of normal tissue. The potential disadvantages of Mohs' surgery include the duration of the surgery, the possible need for a separate surgery for reconstruction following tumor removal, and scarring as a result. In addition, general potential risks of surgery as noted above also apply to treatment via Mohs' surgery.    In light of the nature of these tumors and the location(s) on the scalp in an area of increased risk of recurrence,  Mohs' micrographic surgery was thought to be the most appropriate management choice, and this diagnosis is appropriate for treatment by Mohs' micrographic surgery.      We also discussed options for repair of the site to follow tumor removal via Mohs' surgery, including the participation of a reconstructive surgeon to reconstruct the resultant wound. Given the location, the anticipated size and potential complexity of the defect to follow tumor removal via Mohs' surgery, reconstruction will be coordinated with Dr. Gonzales Gates. He is scheduled to see Dr. Gates this afternoon in consultation, following which we will coordinate his surgeries.    Sufficient time was available for questions, and all questions were answered to his satisfaction. He fully understands the aims, risks, alternatives, and possible complications, and has elected to proceed with the surgery, and verbally consented to do so. The procedure will be scheduled in the near future.    --------------------------------------  Note: Some or all of this note may have been generated using voice recognition software. There may be voice recognition errors including grammatical and/or spelling errors found in the text. Attempts were made to correct these errors prior to signature.    =================  note of 10/21  +++ HAS A DEFIBRILLATOR +++  Prior photo(s) of site(s) to confirm location(s):    ALLERGIES:   Patient has no known allergies.  +++ HAS A DEFIBRILLATOR +++     Current Outpatient Medications:     furosemide (LASIX) 20 MG tablet, Take 20 mg by mouth 2 (two) times daily., Disp: , Rfl:     pirfenidone (ESBRIET) 267 mg Cap, Take by mouth., Disp: , Rfl:     pravastatin (PRAVACHOL) 20 MG tablet, Take 20 mg by mouth once daily., Disp: , Rfl:     sacubitril-valsartan (ENTRESTO) 24-26 mg per tablet, Take 1 tablet by mouth 2 (two) times daily., Disp: , Rfl:     vitamin D (VITAMIN D3) 1000 units Tab, Take 1,000 Units by mouth once daily., Disp: , Rfl:     warfarin (COUMADIN) 5 MG tablet, Take 5 mg by mouth once daily., Disp: , Rfl:   -------------------------------------------------------------  Allergies:  Review of patient's allergies indicates:  No Known Allergies    Chief Complaint: here for Mohs' surgery to superficially invasive squamous cell carcinoma on the right scalp    The patient is accompanied to this visit by his wife.    HPI:   Location: right scalp  Quality: persistent  Timing: I last saw him two and a half weeks ago  Context: prior biopsy showed superficially invasive squamous cell carcinoma     Review of Systems:  CV: has a defibrillator  Resp: on O2 via nasal cannula 24/7 for pulmonary fibrosis  Skin: has some other rough spots on his scalp    Medications: see list    Current Outpatient Medications:     furosemide (LASIX) 20 MG tablet, Take 20 mg by mouth 2 (two) times daily., Disp: , Rfl:     pirfenidone (ESBRIET) 267 mg Cap, Take by mouth., Disp: , Rfl:     pravastatin (PRAVACHOL) 20 MG tablet, Take 20 mg by mouth once daily., Disp: , Rfl:     sacubitril-valsartan (ENTRESTO) 24-26 mg per tablet, Take 1 tablet by mouth 2 (two) times daily., Disp: , Rfl:     vitamin D (VITAMIN D3) 1000 units Tab, Take 1,000 Units by mouth once daily., Disp: , Rfl:     warfarin (COUMADIN) 5 MG tablet, Take 5 mg by mouth once daily., Disp: , Rfl:     Review of patient's allergies indicates:  No Known Allergies    Past Medical History:   Diagnosis Date    Arthritis     Defibrillator discharge     Hypertension     Pulmonary fibrosis        Past Surgical History:   Procedure Laterality Date    HERNIA REPAIR         Social History     Socioeconomic History    Marital status:      Spouse name: Not on file    Number of children: Not on file    Years of education: Not on file    Highest education level: Not on file   Occupational History    Not on file   Social Needs    Financial resource strain: Not on file    Food insecurity:     Worry: Not on file     Inability: Not on file    Transportation needs:     Medical: Not on file     Non-medical: Not on file   Tobacco Use    Smoking status: Never Smoker  "  Substance and Sexual Activity    Alcohol use: Not on file    Drug use: Not on file    Sexual activity: Not on file   Lifestyle    Physical activity:     Days per week: Not on file     Minutes per session: Not on file    Stress: Not on file   Relationships    Social connections:     Talks on phone: Not on file     Gets together: Not on file     Attends Gnosticism service: Not on file     Active member of club or organization: Not on file     Attends meetings of clubs or organizations: Not on file     Relationship status: Not on file   Other Topics Concern    Not on file   Social History Narrative    Not on file       Vitals:    10/21/19 0736   BP: (!) 76/42   Pulse: 68   Weight: 59 kg (130 lb)   Height: 5' 6" (1.676 m)        EXAM:  Constitutional  - General appearance: well-developed, well-nourished, well-kempt, somewhat frail-appearing white male; on O2 via nasal cannula    Eyes  - Conjunctivae and lids - no abnormalities; sclerae anicteric  Neurologic/Psychiatric  - Orientation - Alert,  normal orientation to time, place, person  - Mood and affect - Normal mood and affect with no evidence of depression, anxiety, agitation; speech is normal  Skin: see photo below  - Scalp: on his right temporal scalp there is an approximately 1.5 x 2.5 cm pink, centrally crusted plaque at the site of the prior biopsy  Superior/medial to this there is an approx 1 cm pink plaque with some overlying irregular hyperkeratosis which is clinically suggestive of another area of superficially invasive squamous cell carcinoma    Photo today:       Assessment:  Biopsy proven squamous cell carcinoma of the right temporal scalp, pending treatment  Possible second squamous cell carcinoma vs actinic keratosis vs other, superior/medial to the lesion above    Plan:  I discussed with the patient the diagnoses and management options, and risks, benefits and alternatives. This discussion included but was not limited to the following:    I " reviewed with him the nature of the previously biopsied site on the scalp. I explained that this is not an issue which requires immediate/urgent treatment.     We discussed my clinical impression regarding the site superior/medial to the previously biopsied site, and options at this point, and the risks and benefits of the alternatives.  In particular, we discussed proceeding with Mohs' surgery to the previously-biopsied site and biopsy of the other lesion at this visit, versus deferring treatment of the previously-biopsied site and biopsy of the other lesion today, to establish a definitive diagnosis and guide further treatment, with the anticipation that if the 2nd lesion is also a skin cancer, both could be treated via Mohs' surgery at the same time.    All questions were answered to his satisfaction.    After discussing the alternatives, he and I have decided to defer surgery to the previously-biopsied site today, and biopsy the other site.    See the procedure note below.    PROCEDURE NOTE: SHAVE BIOPSY    The diagnosis and management options and risk, benefits and alternatives were discussed with the patient. All questions were answered. Verbal consent was obtained.    Site: right medial scalp  Indication: clinically suspicious lesion; rule out malignancy  Prep: Alcohol  Anesthesia: 1% lidocaine plain with epi 1:100K, less than 2 mL  Shave biopsy performed  Hemostasis with hot-tipped cautery pen  Dressed with petrolatum and bandaid  Specimen placed in formalin to be submitted to pathology    Routine care instructions given  Followup: per path  -----------------------------------------------------------  Note: Some or all of this note may have been generated using voice recognition software. There may be voice recognition errors including grammatical and/or spelling errors found in the text. Attempts were made to correct these errors prior to signature.

## 2020-03-31 ENCOUNTER — TELEPHONE (OUTPATIENT)
Dept: DERMATOLOGY | Facility: CLINIC | Age: 85
End: 2020-03-31

## 2020-05-19 ENCOUNTER — TELEPHONE (OUTPATIENT)
Dept: DERMATOLOGY | Facility: CLINIC | Age: 85
End: 2020-05-19

## 2020-05-19 NOTE — TELEPHONE ENCOUNTER
Patient wife called and canceled his appointment because of the pulmanary fibrosis is worse and to weak to get up. Will call us when he is better.

## 2020-07-21 ENCOUNTER — TELEPHONE (OUTPATIENT)
Dept: DERMATOLOGY | Facility: CLINIC | Age: 85
End: 2020-07-21